# Patient Record
Sex: MALE | Race: BLACK OR AFRICAN AMERICAN | NOT HISPANIC OR LATINO | URBAN - METROPOLITAN AREA
[De-identification: names, ages, dates, MRNs, and addresses within clinical notes are randomized per-mention and may not be internally consistent; named-entity substitution may affect disease eponyms.]

---

## 2024-08-13 ENCOUNTER — APPOINTMENT (OUTPATIENT)
Dept: PRIMARY CARE | Facility: CLINIC | Age: 24
End: 2024-08-13

## 2024-08-13 ENCOUNTER — HOSPITAL ENCOUNTER (EMERGENCY)
Facility: HOSPITAL | Age: 24
Discharge: HOME | End: 2024-08-13
Attending: STUDENT IN AN ORGANIZED HEALTH CARE EDUCATION/TRAINING PROGRAM
Payer: COMMERCIAL

## 2024-08-13 VITALS
BODY MASS INDEX: 44.1 KG/M2 | HEIGHT: 71 IN | RESPIRATION RATE: 11 BRPM | WEIGHT: 315 LBS | HEART RATE: 98 BPM | OXYGEN SATURATION: 98 %

## 2024-08-13 VITALS
HEIGHT: 71 IN | DIASTOLIC BLOOD PRESSURE: 146 MMHG | WEIGHT: 315 LBS | TEMPERATURE: 98.6 F | BODY MASS INDEX: 44.1 KG/M2 | RESPIRATION RATE: 16 BRPM | OXYGEN SATURATION: 98 % | SYSTOLIC BLOOD PRESSURE: 198 MMHG | HEART RATE: 90 BPM

## 2024-08-13 DIAGNOSIS — R36.1 BLOOD IN SEMEN: Primary | ICD-10-CM

## 2024-08-13 DIAGNOSIS — I10 HYPERTENSION, UNSPECIFIED TYPE: Primary | ICD-10-CM

## 2024-08-13 DIAGNOSIS — I10 HYPERTENSION, UNSPECIFIED TYPE: ICD-10-CM

## 2024-08-13 PROCEDURE — 99283 EMERGENCY DEPT VISIT LOW MDM: CPT

## 2024-08-13 PROCEDURE — 3008F BODY MASS INDEX DOCD: CPT | Performed by: INTERNAL MEDICINE

## 2024-08-13 PROCEDURE — 99204 OFFICE O/P NEW MOD 45 MIN: CPT | Performed by: INTERNAL MEDICINE

## 2024-08-13 PROCEDURE — 1036F TOBACCO NON-USER: CPT | Performed by: INTERNAL MEDICINE

## 2024-08-13 PROCEDURE — 2500000001 HC RX 250 WO HCPCS SELF ADMINISTERED DRUGS (ALT 637 FOR MEDICARE OP): Performed by: STUDENT IN AN ORGANIZED HEALTH CARE EDUCATION/TRAINING PROGRAM

## 2024-08-13 RX ORDER — CIPROFLOXACIN 500 MG/1
500 TABLET ORAL 2 TIMES DAILY
Qty: 20 TABLET | Refills: 0 | Status: SHIPPED | OUTPATIENT
Start: 2024-08-13 | End: 2024-08-23

## 2024-08-13 RX ORDER — AMLODIPINE BESYLATE 5 MG/1
5 TABLET ORAL ONCE
Status: COMPLETED | OUTPATIENT
Start: 2024-08-13 | End: 2024-08-13

## 2024-08-13 RX ORDER — AMLODIPINE BESYLATE 5 MG/1
5 TABLET ORAL DAILY
Qty: 30 TABLET | Refills: 11 | Status: SHIPPED | OUTPATIENT
Start: 2024-08-13 | End: 2025-08-13

## 2024-08-13 ASSESSMENT — COLUMBIA-SUICIDE SEVERITY RATING SCALE - C-SSRS
1. IN THE PAST MONTH, HAVE YOU WISHED YOU WERE DEAD OR WISHED YOU COULD GO TO SLEEP AND NOT WAKE UP?: NO
2. HAVE YOU ACTUALLY HAD ANY THOUGHTS OF KILLING YOURSELF?: NO
6. HAVE YOU EVER DONE ANYTHING, STARTED TO DO ANYTHING, OR PREPARED TO DO ANYTHING TO END YOUR LIFE?: NO

## 2024-08-13 NOTE — PROGRESS NOTES
"Subjective   Chief complaint: Junior Boyd is a 24 y.o. male who presents for New Patient Visit ( new patient visit patient being seen for blood in semen noticed it 4-5 days ago. ).    HPI:  Patient here to establish care. He is unsure of the last time he saw a provider for general medical care. He denies any PMH and does not take any medications. He has a family history of HTN and DM. He denies smoking, drinking or drug use. Today he is complaining of blood in his semen in which he noticed on 8/10/24. He states it is a lot of bright red blood and has happened 3 times since then as well. He denies any blood in his urine or pain and this has never happened before. He is not sexually active. Today his BP was significantly elevated after multiple attempts. He states that he gets nervous when people take his BP and it is always high. He denies any headache or vision changes.         Objective   Pulse 98   Resp 11   Ht 1.803 m (5' 11\")   Wt (!) 152 kg (335 lb)   SpO2 98%   BMI 46.72 kg/m²   Physical Exam  HENT:      Head: Normocephalic.      Nose: Nose normal.      Mouth/Throat:      Mouth: Mucous membranes are moist.      Pharynx: Oropharynx is clear.   Eyes:      Pupils: Pupils are equal, round, and reactive to light.   Cardiovascular:      Rate and Rhythm: Normal rate and regular rhythm.      Pulses: Normal pulses.      Heart sounds: Normal heart sounds.   Pulmonary:      Effort: Pulmonary effort is normal.      Breath sounds: Normal breath sounds.   Abdominal:      General: Bowel sounds are normal.   Musculoskeletal:         General: Normal range of motion.      Cervical back: Normal range of motion.   Skin:     General: Skin is warm and dry.      Capillary Refill: Capillary refill takes less than 2 seconds.   Neurological:      Mental Status: He is alert and oriented to person, place, and time.         I have reviewed and reconciled the medication list with the patient today. No current outpatient " "medications on file.     Imaging:  No results found.     Labs reviewed:    No results found for: \"WBC\", \"HGB\", \"HCT\", \"PLT\", \"CHOL\", \"TRIG\", \"HDL\", \"LDLDIRECT\", \"ALT\", \"AST\", \"NA\", \"K\", \"CL\", \"CREATININE\", \"BUN\", \"CO2\", \"TSH\", \"PSA\", \"INR\", \"GLUF\", \"HGBA1C\", \"ALBUR\"    Assessment/Plan   Problem List Items Addressed This Visit          Cardiac and Vasculature    HTN (hypertension)     /140 (R) and 192/138 (L)   Start 5mg amlodipine once per day  Re evaluate at next visit.             Genitourinary and Reproductive    Blood in semen - Primary     Start Cipro 500 for 10 days            Continue current medications as listed  Follow up in 2 weeks for complete physical.      "

## 2024-08-14 NOTE — DISCHARGE INSTRUCTIONS
You were seen in the emergency department for high blood pressure.  We discussed what to look out for in terms of high blood pressure.  Please take the amlodipine as prescribed by your primary care doctor.  If you experience any headache, changes in vision (specifically blurry vision), confusion, nausea/vomiting, dizziness, chest pain, shortness of breath, decreased urination or leg swelling please return to the emergency department immediately as that is signs concerning for blood pressure damaging your organs.  Otherwise, please follow-up with your doctor next week.

## 2024-08-14 NOTE — ED PROVIDER NOTES
"[facility] ED Note    Date of Service: 8/13/2024  Reason for Visit: Hypertension (Went to docs office was prescribed amlodipine)      Patient History     ASHLY Boyd is a 24 y.o. male with no significant past medical history presents to the emergency department for hypertension.  Patient was just at his primary care doctor's office today establishing care, he was noted to have low blood pressure of 198/140.  He was started on 5 mg of amlodipine and had planned to have this reassessed at the next visit.  Patient states he was asymptomatic at that time.  He states that he did measure his blood pressure earlier this evening and noted to be 198 and given that it remained persistently elevated despite taking 5 mg of amlodipine, he came to the emergency department.  He denies any headache, changes in vision, confusion, nausea/vomiting, chest pain, shortness of breath, abdominal pain, and lower extremity edema.  He further denies any urinary retention.  He states he otherwise feels well and the same that he did earlier this morning at his appointment.      Past Medical History:   Diagnosis Date    Overweight     Overweight     No past surgical history on file.      Physical Exam     Vitals:    08/13/24 2054 08/13/24 2117 08/13/24 2219   BP: (!) 226/126 (!) 203/136 (!) 198/146   Pulse: (!) 119 (!) 114 90   Resp: 16     Temp: 37 °C (98.6 °F)     SpO2: 98%     Weight: 148 kg (326 lb 15.1 oz)     Height: 1.803 m (5' 11\")       General: Age-appropriate male, obese, nontoxic, sitting comfortably [place] no acute distress.  Pulmonary:   Non-labored breathing. Breath sounds clear bilaterally  Cardiac:  Regular rate   Abdomen:  Soft. Non-tender. Non-distended  Musculoskeletal:  No peripheral edema   Neuro:  Alert and oriented x 4. CN II-XII intact. 5/5 strength in all 4 extremities. Sensation grossly intact to light touch. Speech and mentation normal.  Gait narrow and stable     Diagnostic Studies "     Labs:  Please see EMR for labs obtained during this patient encounter.    Radiology:  Please see EMR for imaging obtained during this patient encounter.    ED Course and MDM     Junior Boyd is a 24 y.o. male with a history and presentation as described above in HPI.      Upon presentation, the patient was afebrile, well-appearing, with vital signs notable for hypertension and tachycardia initially.  Patient presented to the emergency department for asymptomatic hypertension.  Patient did not have any signs/symptoms to suggest endorgan damage, therefore no laboratory work or EKG was obtained during this visit.  Patient was given an additional dose of amlodipine here in the emergency department, he was observed for period of time with some improvement of his blood pressure as well as his heart rate.  Patient remained asymptomatic throughout his evaluation.  He just established care with his primary doctor today, and was just put on amlodipine, therefore no changes were made to his antihypertensive regiment as he does have follow-up with his primary care doctor next week.  He was given strict return precautions, specifically precautions to look for to suggest endorgan damage including headache, changes in vision, confusion or altered mental status, nausea/vomiting, chest pain, shortness of breath, urinary retention, and lower extremity edema.  He was subsequently discharged and instructed to follow-up with his primary care doctor next week.      Impression     Diagnoses as of 08/13/24 2229   Hypertension, unspecified type        Plan       Discharge, see DCI provided      Barrie Ellis MD  [facility] Emergency Medicine         Barrie Ellis MD  08/13/24 2229

## 2024-08-15 ENCOUNTER — TELEPHONE (OUTPATIENT)
Dept: PRIMARY CARE | Facility: CLINIC | Age: 24
End: 2024-08-15
Payer: COMMERCIAL

## 2024-08-15 DIAGNOSIS — E55.9 VITAMIN D DEFICIENCY: ICD-10-CM

## 2024-08-15 DIAGNOSIS — R36.1 BLOOD IN SEMEN: ICD-10-CM

## 2024-08-15 DIAGNOSIS — Z00.00 ANNUAL PHYSICAL EXAM: ICD-10-CM

## 2024-08-15 DIAGNOSIS — I10 HTN (HYPERTENSION): Primary | ICD-10-CM

## 2024-08-20 ENCOUNTER — APPOINTMENT (OUTPATIENT)
Dept: PRIMARY CARE | Facility: CLINIC | Age: 24
End: 2024-08-20
Payer: COMMERCIAL

## 2024-08-23 ENCOUNTER — APPOINTMENT (OUTPATIENT)
Dept: PRIMARY CARE | Facility: CLINIC | Age: 24
End: 2024-08-23
Payer: COMMERCIAL

## 2024-09-10 ENCOUNTER — LAB (OUTPATIENT)
Dept: LAB | Facility: LAB | Age: 24
End: 2024-09-10
Payer: COMMERCIAL

## 2024-09-10 DIAGNOSIS — E55.9 VITAMIN D DEFICIENCY: ICD-10-CM

## 2024-09-10 DIAGNOSIS — Z00.00 ANNUAL PHYSICAL EXAM: ICD-10-CM

## 2024-09-10 DIAGNOSIS — I10 HTN (HYPERTENSION): ICD-10-CM

## 2024-09-10 DIAGNOSIS — R36.1 BLOOD IN SEMEN: ICD-10-CM

## 2024-09-10 LAB
ERYTHROCYTE [DISTWIDTH] IN BLOOD BY AUTOMATED COUNT: 11.6 % (ref 11.5–14.5)
EST. AVERAGE GLUCOSE BLD GHB EST-MCNC: 108 MG/DL
HBA1C MFR BLD: 5.4 %
HCT VFR BLD AUTO: 50.9 % (ref 41–52)
HGB BLD-MCNC: 16.4 G/DL (ref 13.5–17.5)
MCH RBC QN AUTO: 26.1 PG (ref 26–34)
MCHC RBC AUTO-ENTMCNC: 32.2 G/DL (ref 32–36)
MCV RBC AUTO: 81 FL (ref 80–100)
NRBC BLD-RTO: 0 /100 WBCS (ref 0–0)
PLATELET # BLD AUTO: 321 X10*3/UL (ref 150–450)
RBC # BLD AUTO: 6.29 X10*6/UL (ref 4.5–5.9)
WBC # BLD AUTO: 12.5 X10*3/UL (ref 4.4–11.3)

## 2024-09-10 PROCEDURE — 80053 COMPREHEN METABOLIC PANEL: CPT

## 2024-09-10 PROCEDURE — 82306 VITAMIN D 25 HYDROXY: CPT

## 2024-09-10 PROCEDURE — 84443 ASSAY THYROID STIM HORMONE: CPT

## 2024-09-10 PROCEDURE — 80061 LIPID PANEL: CPT

## 2024-09-10 PROCEDURE — 36415 COLL VENOUS BLD VENIPUNCTURE: CPT

## 2024-09-10 PROCEDURE — 84153 ASSAY OF PSA TOTAL: CPT

## 2024-09-10 PROCEDURE — 85027 COMPLETE CBC AUTOMATED: CPT

## 2024-09-10 PROCEDURE — 83036 HEMOGLOBIN GLYCOSYLATED A1C: CPT

## 2024-09-11 LAB
25(OH)D3 SERPL-MCNC: 12 NG/ML (ref 30–100)
ALBUMIN SERPL BCP-MCNC: 4.8 G/DL (ref 3.4–5)
ALP SERPL-CCNC: 130 U/L (ref 33–120)
ALT SERPL W P-5'-P-CCNC: 64 U/L (ref 10–52)
ANION GAP SERPL CALC-SCNC: 19 MMOL/L (ref 10–20)
AST SERPL W P-5'-P-CCNC: 32 U/L (ref 9–39)
BILIRUB SERPL-MCNC: 0.7 MG/DL (ref 0–1.2)
BUN SERPL-MCNC: 14 MG/DL (ref 6–23)
CALCIUM SERPL-MCNC: 10.3 MG/DL (ref 8.6–10.6)
CHLORIDE SERPL-SCNC: 97 MMOL/L (ref 98–107)
CHOLEST SERPL-MCNC: 232 MG/DL (ref 0–199)
CHOLESTEROL/HDL RATIO: 4.5
CO2 SERPL-SCNC: 25 MMOL/L (ref 21–32)
CREAT SERPL-MCNC: 1.32 MG/DL (ref 0.5–1.3)
EGFRCR SERPLBLD CKD-EPI 2021: 77 ML/MIN/1.73M*2
GLUCOSE SERPL-MCNC: 109 MG/DL (ref 74–99)
HDLC SERPL-MCNC: 51.1 MG/DL
LDLC SERPL CALC-MCNC: 156 MG/DL
NON HDL CHOLESTEROL: 181 MG/DL (ref 0–149)
POTASSIUM SERPL-SCNC: 3.8 MMOL/L (ref 3.5–5.3)
PROT SERPL-MCNC: 8.1 G/DL (ref 6.4–8.2)
PSA SERPL-MCNC: 0.61 NG/ML
SODIUM SERPL-SCNC: 137 MMOL/L (ref 136–145)
TRIGL SERPL-MCNC: 126 MG/DL (ref 0–149)
TSH SERPL-ACNC: 1.58 MIU/L (ref 0.44–3.98)
VLDL: 25 MG/DL (ref 0–40)

## 2024-09-20 ENCOUNTER — APPOINTMENT (OUTPATIENT)
Dept: PRIMARY CARE | Facility: CLINIC | Age: 24
End: 2024-09-20
Payer: COMMERCIAL

## 2024-09-20 VITALS — HEART RATE: 93 BPM | RESPIRATION RATE: 12 BRPM | WEIGHT: 315 LBS | BODY MASS INDEX: 46.3 KG/M2 | OXYGEN SATURATION: 96 %

## 2024-09-20 DIAGNOSIS — E78.2 MODERATE MIXED HYPERLIPIDEMIA NOT REQUIRING STATIN THERAPY: ICD-10-CM

## 2024-09-20 DIAGNOSIS — Z00.00 ENCOUNTER FOR ANNUAL PHYSICAL EXAM: ICD-10-CM

## 2024-09-20 DIAGNOSIS — I10 HYPERTENSION, UNSPECIFIED TYPE: ICD-10-CM

## 2024-09-20 DIAGNOSIS — E78.00 ELEVATED LDL CHOLESTEROL LEVEL: ICD-10-CM

## 2024-09-20 DIAGNOSIS — R79.89 ELEVATED LFTS: ICD-10-CM

## 2024-09-20 DIAGNOSIS — I10 PRIMARY HYPERTENSION: ICD-10-CM

## 2024-09-20 DIAGNOSIS — I10 BENIGN ESSENTIAL HYPERTENSION: ICD-10-CM

## 2024-09-20 DIAGNOSIS — E03.9 HYPOTHYROIDISM, UNSPECIFIED TYPE: ICD-10-CM

## 2024-09-20 DIAGNOSIS — E55.9 VITAMIN D DEFICIENCY: Primary | ICD-10-CM

## 2024-09-20 DIAGNOSIS — D50.9 IRON DEFICIENCY ANEMIA, UNSPECIFIED IRON DEFICIENCY ANEMIA TYPE: ICD-10-CM

## 2024-09-20 PROBLEM — E78.5 HYPERLIPIDEMIA: Status: ACTIVE | Noted: 2024-09-20

## 2024-09-20 LAB
POC APPEARANCE, URINE: CLEAR
POC BILIRUBIN, URINE: NEGATIVE
POC BLOOD, URINE: NEGATIVE
POC COLOR, URINE: ABNORMAL
POC GLUCOSE, URINE: NEGATIVE MG/DL
POC KETONES, URINE: NEGATIVE MG/DL
POC LEUKOCYTES, URINE: NEGATIVE
POC NITRITE,URINE: NEGATIVE
POC PH, URINE: 5 PH
POC PROTEIN, URINE: NEGATIVE MG/DL
POC SPECIFIC GRAVITY, URINE: 1.02
POC UROBILINOGEN, URINE: 0.2 EU/DL

## 2024-09-20 PROCEDURE — 80074 ACUTE HEPATITIS PANEL: CPT

## 2024-09-20 PROCEDURE — 85027 COMPLETE CBC AUTOMATED: CPT

## 2024-09-20 ASSESSMENT — ENCOUNTER SYMPTOMS
DEPRESSION: 0
LOSS OF SENSATION IN FEET: 0
OCCASIONAL FEELINGS OF UNSTEADINESS: 0

## 2024-09-20 ASSESSMENT — PATIENT HEALTH QUESTIONNAIRE - PHQ9
SUM OF ALL RESPONSES TO PHQ9 QUESTIONS 1 AND 2: 0
1. LITTLE INTEREST OR PLEASURE IN DOING THINGS: NOT AT ALL
2. FEELING DOWN, DEPRESSED OR HOPELESS: NOT AT ALL

## 2024-09-20 NOTE — ASSESSMENT & PLAN NOTE
Flax seed omega 3 1200mcg with every meal (TID)    Discussed importance of healthy diet - consider sharing diet informational packet at next visit

## 2024-09-20 NOTE — PROGRESS NOTES
ANNUAL WELLNESS VISIT    Subjective :  Chief Complaint: Junior Boyd is an 24 y.o. male here for an annual wellness visit and general medical care and f/u.     HPI:  Pt presents for annual wellness exam, with PMH HTN on amlodipine 5mg.    Walks moderately for exercise. Blood pressure high at home, ~167/117 at the lowest.     No tobacco use, marijuana use, drug use. Very minimal alcohol use.         Objective   Pulse 93   Resp 12   Wt (!) 151 kg (332 lb)   SpO2 96%   BMI 46.30 kg/m²     Physical Exam  Constitutional:       Appearance: Normal appearance.   HENT:      Head: Normocephalic and atraumatic.      Right Ear: External ear normal.      Left Ear: External ear normal.      Nose: Nose normal.   Eyes:      Extraocular Movements: Extraocular movements intact.   Neck:      Vascular: No carotid bruit.   Cardiovascular:      Rate and Rhythm: Normal rate and regular rhythm.      Heart sounds: Normal heart sounds.   Pulmonary:      Effort: Pulmonary effort is normal.      Breath sounds: Normal breath sounds.   Abdominal:      General: Bowel sounds are normal. There is no distension.      Palpations: Abdomen is soft. There is no mass.      Tenderness: There is no abdominal tenderness. There is no guarding.      Hernia: No hernia is present.   Musculoskeletal:      Cervical back: Neck supple. No rigidity or tenderness.   Lymphadenopathy:      Cervical: No cervical adenopathy.   Skin:     General: Skin is dry.   Neurological:      General: No focal deficit present.      Mental Status: He is alert.   Psychiatric:         Mood and Affect: Mood normal.         Behavior: Behavior normal.         Thought Content: Thought content normal.         Judgment: Judgment normal.         Imaging:  No results found.     Labs reviewed:    Lab Results   Component Value Date    WBC 12.5 (H) 09/10/2024    HGB 16.4 09/10/2024    HCT 50.9 09/10/2024     09/10/2024    CHOL 232 (H) 09/10/2024    TRIG 126 09/10/2024    HDL 51.1  09/10/2024    ALT 64 (H) 09/10/2024    AST 32 09/10/2024     09/10/2024    K 3.8 09/10/2024    CL 97 (L) 09/10/2024    CREATININE 1.32 (H) 09/10/2024    BUN 14 09/10/2024    CO2 25 09/10/2024    TSH 1.58 09/10/2024    HGBA1C 5.4 09/10/2024       Past Medical, Surgical, and Family History reviewed and updated in chart.    I have reviewed and reconciled the medication list with the patient today. No current outpatient medications on file.     List of current healthcare providers:  Patient Care Team:  Ez Smiht MD as PCP - General (Internal Medicine)     HRA:  Over the past 2 weeks, how often have you been bothered by any of the following problems?  Little interest or pleasure in doing things: Not at all  Feeling down, depressed, or hopeless: Not at all  Patient Health Questionnaire-2 Score: 0    Steadi Fall Risk  One or more falls in the last year? No  How many Times?    Was the patient injured in the fall?    Has trouble stepping onto curb? No  Advised to use a cane or walker to get around safely? No  Often has to rush to toilet? No  Feels unsteady when walking? No  Has lost some feeling in feet? No  Often feels sad or depressed? No  Steadies self on furniture while walking at home? No  Takes medicine that makes them feel lightheaded or more tired than usual? No  Worried about Falling? No  Takes medicine to sleep or improve mood? No  Needs to push with hands when rising from a chair? No                                          Assessment/Plan :  Problem List Items Addressed This Visit       HTN (hypertension)     Increase to amlodipine 10mg         Vitamin D deficiency - Primary     50,000iu weekly for 3 mo.         Relevant Orders    Vitamin D 25-Hydroxy,Total (for eval of Vitamin D levels)    Elevated LFTs     Hepatitis panel     Referral for liver US         Relevant Orders    Hepatitis Panel, Acute    CBC    Lipid Panel    Hyperlipidemia     Flax seed omega 3 1200mcg with every meal (TID)    Discussed  importance of healthy diet - consider sharing diet informational packet at next visit          Other Visit Diagnoses       Encounter for annual physical exam        Relevant Orders    POCT UA (nonautomated) manually resulted (Completed)    Hypothyroidism, unspecified type        Iron deficiency anemia, unspecified iron deficiency anemia type        Benign essential hypertension        Relevant Orders    Comprehensive Metabolic Panel    Elevated LDL cholesterol level              The following health maintenance schedule was reviewed with the patient and provided in printed form in the after visit summary:  Health Maintenance   Topic Date Due    HIV Screening  Never done    MMR Vaccines (1 of 1 - Standard series) Never done    Varicella Vaccines (2 of 2 - 2-dose childhood series) 01/03/2007    HPV Vaccines (1 - Male 3-dose series) Never done    Hepatitis C Screening  Never done    Hepatitis B Vaccines (1 of 3 - 19+ 3-dose series) Never done    DTaP/Tdap/Td Vaccines (1 - Tdap) Never done    Influenza Vaccine (1) Never done    COVID-19 Vaccine (3 - 2023-24 season) 09/01/2024    TSH Level  09/10/2025    Yearly Adult Physical  09/21/2025    Lipid Panel  09/10/2029    Zoster Vaccines (1 of 2) 02/22/2050    HIB Vaccines  Aged Out    IPV Vaccines  Aged Out    Hepatitis A Vaccines  Aged Out    Meningococcal Vaccine  Aged Out    Rotavirus Vaccines  Aged Out    Pneumococcal Vaccine: Pediatrics (0 to 5 Years) and At-Risk Patients (6 to 64 Years)  Aged Out       Advance Care Planning   Not at this time              Orders Placed This Encounter   Procedures    Hepatitis Panel, Acute     Order Specific Question:   Release result to [facility]     Answer:   Immediate    CBC     Order Specific Question:   Release result to [facility] [employer]     Answer:   Immediate    Comprehensive Metabolic Panel     Standing Status:   Future     Standing Expiration Date:   12/20/2024     Order Specific Question:   Release result to [facility]     Answer:    Immediate    Lipid Panel     Standing Status:   Future     Standing Expiration Date:   12/20/2024     Order Specific Question:   Release result to [facility]     Answer:   Immediate    Vitamin D 25-Hydroxy,Total (for eval of Vitamin D levels)     Standing Status:   Future     Standing Expiration Date:   12/20/2024     Order Specific Question:   Release result to [employer]     Answer:   Immediate    POCT UA (nonautomated) manually resulted     Order Specific Question:   Release result to [employer]     Answer:   Immediate [1]       Continue current medications as listed  Follow up in 2 weeks for BP; 3 weeks for blood work.

## 2024-09-21 LAB
ERYTHROCYTE [DISTWIDTH] IN BLOOD BY AUTOMATED COUNT: 11.7 % (ref 11.5–14.5)
HAV IGM SER QL: NONREACTIVE
HBV CORE IGM SER QL: NONREACTIVE
HBV SURFACE AG SERPL QL IA: NONREACTIVE
HCT VFR BLD AUTO: 48.9 % (ref 41–52)
HCV AB SER QL: NONREACTIVE
HGB BLD-MCNC: 15.8 G/DL (ref 13.5–17.5)
MCH RBC QN AUTO: 26.3 PG (ref 26–34)
MCHC RBC AUTO-ENTMCNC: 32.3 G/DL (ref 32–36)
MCV RBC AUTO: 81 FL (ref 80–100)
NRBC BLD-RTO: 0 /100 WBCS (ref 0–0)
PLATELET # BLD AUTO: 284 X10*3/UL (ref 150–450)
RBC # BLD AUTO: 6.01 X10*6/UL (ref 4.5–5.9)
WBC # BLD AUTO: 9.7 X10*3/UL (ref 4.4–11.3)

## 2024-09-21 RX ORDER — AMLODIPINE BESYLATE 10 MG/1
10 TABLET ORAL DAILY
Qty: 30 TABLET | Refills: 5 | Status: SHIPPED | OUTPATIENT
Start: 2024-09-21 | End: 2025-03-20

## 2024-09-21 RX ORDER — ERGOCALCIFEROL 1.25 MG/1
50000 CAPSULE ORAL WEEKLY
Qty: 6 CAPSULE | Refills: 0 | Status: SHIPPED | OUTPATIENT
Start: 2024-09-21 | End: 2024-11-02

## 2024-09-21 RX ORDER — FLAXSEED OIL 1000 MG
1 CAPSULE ORAL 3 TIMES DAILY
Qty: 90 CAPSULE | Refills: 3 | Status: SHIPPED | OUTPATIENT
Start: 2024-09-21

## 2024-09-27 ENCOUNTER — HOSPITAL ENCOUNTER (EMERGENCY)
Facility: HOSPITAL | Age: 24
Discharge: HOME | End: 2024-09-27
Payer: COMMERCIAL

## 2024-09-27 VITALS
SYSTOLIC BLOOD PRESSURE: 171 MMHG | DIASTOLIC BLOOD PRESSURE: 111 MMHG | RESPIRATION RATE: 18 BRPM | HEART RATE: 87 BPM | WEIGHT: 315 LBS | TEMPERATURE: 97.6 F | BODY MASS INDEX: 44.1 KG/M2 | OXYGEN SATURATION: 98 % | HEIGHT: 71 IN

## 2024-09-27 DIAGNOSIS — I99.8 POORLY CONTROLLED BLOOD PRESSURE: ICD-10-CM

## 2024-09-27 DIAGNOSIS — H61.22 IMPACTED CERUMEN OF LEFT EAR: Primary | ICD-10-CM

## 2024-09-27 PROCEDURE — 99282 EMERGENCY DEPT VISIT SF MDM: CPT | Performed by: PHYSICIAN ASSISTANT

## 2024-09-27 PROCEDURE — 69209 REMOVE IMPACTED EAR WAX UNI: CPT | Performed by: PHYSICIAN ASSISTANT

## 2024-09-27 PROCEDURE — 69210 REMOVE IMPACTED EAR WAX UNI: CPT | Mod: LT | Performed by: PHYSICIAN ASSISTANT

## 2024-09-27 ASSESSMENT — PAIN DESCRIPTION - ONSET: ONSET: GRADUAL

## 2024-09-27 ASSESSMENT — PAIN - FUNCTIONAL ASSESSMENT: PAIN_FUNCTIONAL_ASSESSMENT: 0-10

## 2024-09-27 ASSESSMENT — PAIN DESCRIPTION - FREQUENCY: FREQUENCY: CONSTANT/CONTINUOUS

## 2024-09-27 ASSESSMENT — PAIN SCALES - GENERAL
PAINLEVEL_OUTOF10: 3
PAINLEVEL_OUTOF10: 3

## 2024-09-27 ASSESSMENT — PAIN DESCRIPTION - PAIN TYPE: TYPE: ACUTE PAIN

## 2024-09-27 ASSESSMENT — PAIN DESCRIPTION - LOCATION: LOCATION: EAR

## 2024-09-27 ASSESSMENT — PAIN DESCRIPTION - ORIENTATION: ORIENTATION: LEFT

## 2024-09-27 ASSESSMENT — PAIN DESCRIPTION - PROGRESSION: CLINICAL_PROGRESSION: GRADUALLY WORSENING

## 2024-09-27 NOTE — ED PROVIDER NOTES
Chief Complaint   Patient presents with    Ear Fullness     HPI:   Junior Boyd is an 24 y.o. male with history of HTN that presents to the ED with mother for evaluation of left ear fullness.  He says it is not bothering him for the past few days.  He denies any otorrhea, tinnitus, pain, headache, sore throat, neck pain or stiffness, fever, chills, chest pain, dizziness or lightheadedness, blurred vision, double vision, syncope or shortness of breath.    Medications: Amlodipine  Soc HX: Denies substance use NKDA  No Known Allergies:  Past Medical History:   Diagnosis Date    Overweight     Overweight     History reviewed. No pertinent surgical history.  Family History   Problem Relation Name Age of Onset    Other (htn) Mother      Other (htn) Father        Physical Exam  Vitals and nursing note reviewed.   Constitutional:       General: He is not in acute distress.     Appearance: Normal appearance. He is not ill-appearing or toxic-appearing.      Comments: Elevated BMI   HENT:      Right Ear: Tympanic membrane, ear canal and external ear normal.      Left Ear: External ear normal. There is impacted cerumen.      Nose: Nose normal.      Mouth/Throat:      Mouth: Mucous membranes are moist.   Eyes:      Pupils: Pupils are equal, round, and reactive to light.   Cardiovascular:      Rate and Rhythm: Normal rate and regular rhythm.      Pulses: Normal pulses.      Heart sounds: Normal heart sounds.   Pulmonary:      Effort: Pulmonary effort is normal.      Breath sounds: Normal breath sounds.   Musculoskeletal:         General: Normal range of motion.   Skin:     General: Skin is warm and dry.   Neurological:      Mental Status: He is alert.      Cranial Nerves: No cranial nerve deficit.      VS: As documented in the triage note and EMR flowsheet from this visit were reviewed.    External Records Reviewed: I reviewed recent and relevant outside records including: Reviewed PCP note 9/20/2024.  Patient seen for AWV.   He was instructed to increase his amlodipine to 10 mg daily, start vitamin D and make dietary changes.      Medical Decision Making:   ED Course as of 09/27/24 0301   Fri Sep 27, 2024   0200 Vitals Reviewed: Afebrile. Hypertensive. Not tachycardic nor tachypneic. No hypoxia.   [KA]   0257 Patient is a 24-year-old male who presents to the ED for evaluation of left ear fullness.  On exam he has cerumen impaction of the left ear.  It is very deep in the ear canal and it is up against the TM.  I recommended Debrox and follow-up with PCP.  He asked for me to try to get it out with a curette.  We discussed risks of curette removal when earwax is so deep.  He verbalized understanding.  I was only able to remove small amount of cerumen with curette.  He asked me to attempt with irrigation.  Following irrigation no cerumen was removed.  Will send him home with prescription for Debrox.  Patient blood pressure significantly elevated.  He denies any signs or symptoms concerning for hypertensive emergency or urgency and as such does not necessitate workup for asymptomatic hypertension.  I recommended he take his amlodipine 10 mg daily and talk to his doctor about adding a second medication.  He is agreeable plan [KA]      ED Course User Index  [KA] Ania Yen PA-C         Diagnoses as of 09/27/24 0301   Impacted cerumen of left ear   Poorly controlled blood pressure       Ear Cerumen Removal    Performed by: Ania Yen PA-C  Authorized by: Ania Yen PA-C    Consent:     Consent obtained:  Verbal    Consent given by:  Patient    Risks, benefits, and alternatives were discussed: yes      Risks discussed:  Bleeding, infection, dizziness, incomplete removal, pain and TM perforation    Alternatives discussed:  No treatment  Universal protocol:     Procedure explained and questions answered to patient or proxy's satisfaction: yes      Immediately prior to procedure, a time out was called: yes      Patient  identity confirmed:  Verbally with patient  Procedure details:     Location:  L ear    Procedure type: irrigation      Procedure outcomes: unable to remove cerumen    Post-procedure details:     Inspection:  Some cerumen remaining and macerated skin    Hearing quality:  Diminished    Procedure completion:  Tolerated    Chronic Medical Conditions Significantly Affecting Care: Poorly controlled HTN     Escalation of Care: Appropriate for outpatient management    Counseling: Spoke with the patient and discussed today´s findings, in addition to providing specific details for the plan of care and expected course.  Patient was given the opportunity to ask questions.    Discussed return precautions and importance of follow-up.  Advised to follow-up with PCP.  Advised to return to the ED for changing or worsening symptoms, new symptoms, complaint specific precautions, and precautions listed on the discharge paperwork.  Educated on the common potential side effects of medications prescribed.    I advised the patient that the emergency evaluation and treatment provided today doesn't end their need for medical care. It is very important that they follow-up with their primary care provider or other specialist as instructed.    The plan of care was mutually agreed upon with the patient. The patient and/or family were given the opportunity to ask questions. All questions asked today in the ED were answered to the best of my ability with today's information.    I specifically advised the patient to return to the ED for changing or worsening symptoms, worrisome new symptoms, or for any complaint specific precautions listed on the discharge paperwork.    This patient was cared for in the setting of nationwide stress on resources and staffing.    This report was transcribed using voice recognition software.  Every effort was made to ensure accuracy, however, inadvertently computerized transcription errors may be present.        Ania Yen PA-C  09/27/24 3404

## 2024-09-27 NOTE — DISCHARGE INSTRUCTIONS
Please use Debrox drops nightly for the next 4-5 nights.  Follow-up with primary care provider within the next week for ear recheck.    Please continue to take 10 mg of amlodipine daily.  Recommend talking to your doctor about blood pressure recheck and they may need to add a second medication.    Return to ER for any new or worsening symptoms

## 2024-09-27 NOTE — ED TRIAGE NOTES
Pt arrived to the ED with a chief complaint of ear pain. Pt states he has been experiencing ear fullness in his left ear. Pt has trying to unclog his ear but says water makes it worse and he is having trouble hearing. Pain is 3/10. Abcs intact. Denies covid. Pt has recently been experiencing high blood pressure.

## 2024-09-30 ENCOUNTER — HOSPITAL ENCOUNTER (OUTPATIENT)
Dept: RADIOLOGY | Facility: HOSPITAL | Age: 24
Discharge: HOME | End: 2024-09-30
Payer: COMMERCIAL

## 2024-09-30 DIAGNOSIS — R79.89 ELEVATED LFTS: ICD-10-CM

## 2024-09-30 PROCEDURE — 76705 ECHO EXAM OF ABDOMEN: CPT

## 2024-09-30 PROCEDURE — 76705 ECHO EXAM OF ABDOMEN: CPT | Performed by: RADIOLOGY

## 2024-10-04 ENCOUNTER — APPOINTMENT (OUTPATIENT)
Dept: PRIMARY CARE | Facility: CLINIC | Age: 24
End: 2024-10-04
Payer: COMMERCIAL

## 2024-10-04 VITALS
BODY MASS INDEX: 47.28 KG/M2 | WEIGHT: 315 LBS | RESPIRATION RATE: 12 BRPM | OXYGEN SATURATION: 97 % | HEART RATE: 109 BPM

## 2024-10-04 DIAGNOSIS — E66.9 OBESITY, UNSPECIFIED CLASS, UNSPECIFIED OBESITY TYPE, UNSPECIFIED WHETHER SERIOUS COMORBIDITY PRESENT: ICD-10-CM

## 2024-10-04 DIAGNOSIS — E55.9 VITAMIN D DEFICIENCY: ICD-10-CM

## 2024-10-04 DIAGNOSIS — I10 PRIMARY HYPERTENSION: Primary | ICD-10-CM

## 2024-10-04 PROBLEM — K76.0 FATTY METAMORPHOSIS OF LIVER: Status: ACTIVE | Noted: 2024-10-04

## 2024-10-04 PROCEDURE — 1036F TOBACCO NON-USER: CPT | Performed by: INTERNAL MEDICINE

## 2024-10-04 PROCEDURE — 99214 OFFICE O/P EST MOD 30 MIN: CPT | Performed by: INTERNAL MEDICINE

## 2024-10-04 RX ORDER — METOPROLOL SUCCINATE 50 MG/1
50 TABLET, EXTENDED RELEASE ORAL DAILY
Qty: 30 TABLET | Refills: 5 | Status: SHIPPED | OUTPATIENT
Start: 2024-10-04 | End: 2025-04-02

## 2024-10-04 NOTE — ASSESSMENT & PLAN NOTE
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  Discussed with patient importance of weight loss and dietary changes to decrease progression.   Referral made to dietician after discussion with patient.   Continue to monitor LFTs.

## 2024-10-04 NOTE — ASSESSMENT & PLAN NOTE
Referral made to dietician for weight loss and individualized meal planning.   Encouraged increase in physical activity efforts.

## 2024-10-04 NOTE — PROGRESS NOTES
Subjective   Chief complaint: Junior Boyd is a 24 y.o. male who presents for Follow-up ( patient being seen for blood pressure check, patient is also being seen for ultrasound results of liver).    HPI:  Patient is here for a blood pressure follow-up and follow-up on ultrasound of liver results. Does not report any complaints.        Objective   Pulse 109   Resp 12   Wt (!) 154 kg (339 lb)   SpO2 97%   BMI 47.28 kg/m²   Physical Exam  Vitals reviewed.   Constitutional:       Appearance: He is obese.   HENT:      Head: Normocephalic.      Right Ear: External ear normal.      Left Ear: External ear normal.      Mouth/Throat:      Pharynx: Oropharynx is clear.   Eyes:      General: No scleral icterus.     Extraocular Movements: Extraocular movements intact.   Cardiovascular:      Rate and Rhythm: Regular rhythm. Tachycardia present.      Pulses: Normal pulses.      Heart sounds: Normal heart sounds.   Pulmonary:      Effort: Pulmonary effort is normal.      Breath sounds: Normal breath sounds.   Neurological:      General: No focal deficit present.      Mental Status: He is alert.   Psychiatric:         Mood and Affect: Mood normal.         Behavior: Behavior normal.         Thought Content: Thought content normal.         Judgment: Judgment normal.         I have reviewed and reconciled the medication list with the patient today.   Current Outpatient Medications:     amLODIPine (Norvasc) 10 mg tablet, Take 1 tablet (10 mg) by mouth once daily., Disp: 30 tablet, Rfl: 5    ergocalciferol (Vitamin D-2) 1.25 MG (61438 UT) capsule, Take 1 capsule (50,000 Units) by mouth 1 (one) time per week., Disp: 6 capsule, Rfl: 0    flaxseed oiL (Omega-3 flaxseed oiL) 1,000 mg capsule, Take 1 capsule (1,000 mg) by mouth 3 times a day., Disp: 90 capsule, Rfl: 3     Imaging:  US abdomen limited liver    Result Date: 10/1/2024  Interpreted By:  Arsenio Castillo, STUDY: US ABDOMEN LIMITED LIVER; ;  9/30/2024 5:22 pm    INDICATION: Signs/Symptoms:abnormal LFT'S.   COMPARISON: None.   ACCESSION NUMBER(S): [id number]   ORDERING CLINICIAN: MARK CROUCH   TECHNIQUE: Multiple sonographic images of the right upper quadrant are performed.   FINDINGS: The pancreas is obscured by shadowing bowel.   The liver is mildly enlarged measuring 18.8 cm in length. The liver is of diffusely increased echogenicity. There is poor penetration of the liver parenchyma compatible with fatty metamorphosis. There is no surrounding fluid. There is a focal area of relative hypoechogenicity in the liver adjacent to the gallbladder fundus which measures 1.6 x 1.3 x 2.1 cm in diameter. This appearance is suspicious for focal fatty sparing. There is no intrahepatic or extrahepatic bile duct dilatation. The common duct measures 3 mm in diameter.   There is no shadowing calculus in the gallbladder. There is no wall thickening or surrounding fluid. There is a negative sonographic Bravo's sign.   The right kidney measures 12.9 cm in length. The right renal cortex is hypoechoic to liver. There is no hydronephrosis or perinephric fluid collection. No shadowing calculus is detected.       Fatty metamorphosis of the liver.   Focal liver hypoechogenicity adjacent to the gallbladder could reflect focal fatty sparing.   The remainder of the right upper quadrant is unremarkable     MACRO: None   Signed by: Arsenio Castillo 10/1/2024 5:47 PM Dictation workstation:   [id number]       Labs reviewed:    Lab Results   Component Value Date    WBC 9.7 09/20/2024    HGB 15.8 09/20/2024    HCT 48.9 09/20/2024     09/20/2024    CHOL 232 (H) 09/10/2024    TRIG 126 09/10/2024    HDL 51.1 09/10/2024    ALT 64 (H) 09/10/2024    AST 32 09/10/2024     09/10/2024    K 3.8 09/10/2024    CL 97 (L) 09/10/2024    CREATININE 1.32 (H) 09/10/2024    BUN 14 09/10/2024    CO2 25 09/10/2024    TSH 1.58 09/10/2024    HGBA1C 5.4 09/10/2024       Assessment/Plan   Problem List Items  Addressed This Visit       HTN (hypertension) - Primary     BP in office 170/11 right arm, 165/95 left arm.  Reports that he checks it at home periodically and is normally around 170-180s. Denies headache, blurry vision, shortness or breath, chest pain.     Continue Amlodipine 10 mg daily.   Begin Metoprolol-ER 50 mg.   Reinforced lifestyle modifications including physical activity, dietary efforts, and low sodium diet. Follow-up blood pressure check at next visit.          Vitamin D deficiency     Continue Vitamin D supplement.          Obesity     Referral made to dietician for weight loss and individualized meal planning.   Encouraged increase in physical activity efforts.            Continue current medications as listed  Follow up in 3 months.

## 2024-10-04 NOTE — ASSESSMENT & PLAN NOTE
BP in office 170/11 right arm, 165/95 left arm.  Reports that he checks it at home periodically and is normally around 170-180s. Denies headache, blurry vision, shortness or breath, chest pain.     Continue Amlodipine 10 mg daily.   Begin Metoprolol-ER 50 mg.   Reinforced lifestyle modifications including physical activity, dietary efforts, and low sodium diet. Follow-up blood pressure check at next visit.

## 2024-10-18 ENCOUNTER — APPOINTMENT (OUTPATIENT)
Dept: PRIMARY CARE | Facility: CLINIC | Age: 24
End: 2024-10-18
Payer: COMMERCIAL

## 2024-10-18 DIAGNOSIS — E55.9 VITAMIN D DEFICIENCY: ICD-10-CM

## 2024-10-18 DIAGNOSIS — E78.2 MODERATE MIXED HYPERLIPIDEMIA NOT REQUIRING STATIN THERAPY: ICD-10-CM

## 2024-10-18 DIAGNOSIS — D50.9 IRON DEFICIENCY ANEMIA, UNSPECIFIED IRON DEFICIENCY ANEMIA TYPE: ICD-10-CM

## 2024-10-18 DIAGNOSIS — E03.9 HYPOTHYROIDISM, UNSPECIFIED TYPE: ICD-10-CM

## 2024-10-18 DIAGNOSIS — I10 BENIGN ESSENTIAL HYPERTENSION: ICD-10-CM

## 2024-10-18 DIAGNOSIS — I10 PRIMARY HYPERTENSION: ICD-10-CM

## 2024-10-18 DIAGNOSIS — E78.00 ELEVATED LDL CHOLESTEROL LEVEL: ICD-10-CM

## 2024-10-18 LAB
ALBUMIN SERPL BCP-MCNC: 4.6 G/DL (ref 3.4–5)
ALP SERPL-CCNC: 120 U/L (ref 33–120)
ALT SERPL W P-5'-P-CCNC: 52 U/L (ref 10–52)
ANION GAP SERPL CALC-SCNC: 16 MMOL/L (ref 10–20)
AST SERPL W P-5'-P-CCNC: 27 U/L (ref 9–39)
BILIRUB SERPL-MCNC: 0.6 MG/DL (ref 0–1.2)
BUN SERPL-MCNC: 14 MG/DL (ref 6–23)
CALCIUM SERPL-MCNC: 9.9 MG/DL (ref 8.6–10.6)
CHLORIDE SERPL-SCNC: 104 MMOL/L (ref 98–107)
CHOLEST SERPL-MCNC: 242 MG/DL (ref 0–199)
CHOLESTEROL/HDL RATIO: 4.8
CO2 SERPL-SCNC: 24 MMOL/L (ref 21–32)
CREAT SERPL-MCNC: 1.36 MG/DL (ref 0.5–1.3)
EGFRCR SERPLBLD CKD-EPI 2021: 75 ML/MIN/1.73M*2
GLUCOSE SERPL-MCNC: 85 MG/DL (ref 74–99)
HDLC SERPL-MCNC: 50.7 MG/DL
LDLC SERPL CALC-MCNC: 176 MG/DL
NON HDL CHOLESTEROL: 191 MG/DL (ref 0–149)
POTASSIUM SERPL-SCNC: 4.3 MMOL/L (ref 3.5–5.3)
PROT SERPL-MCNC: 7.4 G/DL (ref 6.4–8.2)
SODIUM SERPL-SCNC: 140 MMOL/L (ref 136–145)
TRIGL SERPL-MCNC: 79 MG/DL (ref 0–149)
VLDL: 16 MG/DL (ref 0–40)

## 2024-10-18 PROCEDURE — 80053 COMPREHEN METABOLIC PANEL: CPT

## 2024-10-18 PROCEDURE — 80061 LIPID PANEL: CPT

## 2024-10-18 PROCEDURE — 82306 VITAMIN D 25 HYDROXY: CPT

## 2024-10-19 LAB — 25(OH)D3 SERPL-MCNC: 32 NG/ML (ref 30–100)

## 2024-10-21 ENCOUNTER — APPOINTMENT (OUTPATIENT)
Dept: PRIMARY CARE | Facility: CLINIC | Age: 24
End: 2024-10-21
Payer: COMMERCIAL

## 2024-10-21 VITALS
BODY MASS INDEX: 44.1 KG/M2 | HEIGHT: 71 IN | DIASTOLIC BLOOD PRESSURE: 90 MMHG | HEART RATE: 79 BPM | OXYGEN SATURATION: 97 % | SYSTOLIC BLOOD PRESSURE: 152 MMHG | RESPIRATION RATE: 12 BRPM | WEIGHT: 315 LBS

## 2024-10-21 DIAGNOSIS — E55.9 VITAMIN D DEFICIENCY: ICD-10-CM

## 2024-10-21 DIAGNOSIS — K76.0 FATTY METAMORPHOSIS OF LIVER: ICD-10-CM

## 2024-10-21 DIAGNOSIS — I10 HYPERTENSION, UNSPECIFIED TYPE: ICD-10-CM

## 2024-10-21 DIAGNOSIS — R79.89 ELEVATED LFTS: Primary | ICD-10-CM

## 2024-10-21 DIAGNOSIS — E78.2 MODERATE MIXED HYPERLIPIDEMIA NOT REQUIRING STATIN THERAPY: ICD-10-CM

## 2024-10-21 DIAGNOSIS — N18.9 CHRONIC KIDNEY DISEASE, UNSPECIFIED CKD STAGE: ICD-10-CM

## 2024-10-21 PROCEDURE — 3077F SYST BP >= 140 MM HG: CPT | Performed by: INTERNAL MEDICINE

## 2024-10-21 PROCEDURE — 99214 OFFICE O/P EST MOD 30 MIN: CPT | Performed by: INTERNAL MEDICINE

## 2024-10-21 PROCEDURE — 3080F DIAST BP >= 90 MM HG: CPT | Performed by: INTERNAL MEDICINE

## 2024-10-21 PROCEDURE — 3008F BODY MASS INDEX DOCD: CPT | Performed by: INTERNAL MEDICINE

## 2024-10-21 PROCEDURE — 1036F TOBACCO NON-USER: CPT | Performed by: INTERNAL MEDICINE

## 2024-10-21 RX ORDER — METOPROLOL TARTRATE 100 MG/1
100 TABLET ORAL DAILY
Qty: 90 TABLET | Refills: 1 | Status: SHIPPED | OUTPATIENT
Start: 2024-10-21

## 2024-10-21 NOTE — PROGRESS NOTES
"Subjective   Chief complaint: Junior Boyd is a 24 y.o. male who presents for Follow-up ( patient being seen for blood work follow-up/).    HPI:  Follow-up general medical care        Objective   /90   Pulse 79   Resp 12   Ht 1.803 m (5' 11\")   Wt (!) 152 kg (335 lb)   SpO2 97%   BMI 46.72 kg/m²   Physical Exam  Vitals reviewed.   Constitutional:       Appearance: Normal appearance.   HENT:      Head: Normocephalic and atraumatic.   Cardiovascular:      Rate and Rhythm: Normal rate and regular rhythm.   Pulmonary:      Effort: Pulmonary effort is normal.      Breath sounds: Normal breath sounds.   Abdominal:      General: Bowel sounds are normal.      Palpations: Abdomen is soft.   Musculoskeletal:      Cervical back: Neck supple.   Skin:     General: Skin is warm and dry.   Neurological:      General: No focal deficit present.      Mental Status: He is alert.   Psychiatric:         Mood and Affect: Mood normal.         Behavior: Behavior is cooperative.         I have reviewed and reconciled the medication list with the patient today.   Current Outpatient Medications:     amLODIPine (Norvasc) 10 mg tablet, Take 1 tablet (10 mg) by mouth once daily., Disp: 30 tablet, Rfl: 5    ergocalciferol (Vitamin D-2) 1.25 MG (80161 UT) capsule, Take 1 capsule (50,000 Units) by mouth 1 (one) time per week., Disp: 6 capsule, Rfl: 0    flaxseed oiL (Omega-3 flaxseed oiL) 1,000 mg capsule, Take 1 capsule (1,000 mg) by mouth 3 times a day., Disp: 90 capsule, Rfl: 3     Imaging:  US abdomen limited liver    Result Date: 10/1/2024  Interpreted By:  Arsenio Castillo, STUDY: US ABDOMEN LIMITED LIVER; ;  9/30/2024 5:22 pm   INDICATION: Signs/Symptoms:abnormal LFT'S.   COMPARISON: None.   ACCESSION NUMBER(S): [id number]   ORDERING CLINICIAN: MARK CROUCH   TECHNIQUE: Multiple sonographic images of the right upper quadrant are performed.   FINDINGS: The pancreas is obscured by shadowing bowel.   The liver is mildly " enlarged measuring 18.8 cm in length. The liver is of diffusely increased echogenicity. There is poor penetration of the liver parenchyma compatible with fatty metamorphosis. There is no surrounding fluid. There is a focal area of relative hypoechogenicity in the liver adjacent to the gallbladder fundus which measures 1.6 x 1.3 x 2.1 cm in diameter. This appearance is suspicious for focal fatty sparing. There is no intrahepatic or extrahepatic bile duct dilatation. The common duct measures 3 mm in diameter.   There is no shadowing calculus in the gallbladder. There is no wall thickening or surrounding fluid. There is a negative sonographic Bravo's sign.   The right kidney measures 12.9 cm in length. The right renal cortex is hypoechoic to liver. There is no hydronephrosis or perinephric fluid collection. No shadowing calculus is detected.       Fatty metamorphosis of the liver.   Focal liver hypoechogenicity adjacent to the gallbladder could reflect focal fatty sparing.   The remainder of the right upper quadrant is unremarkable     MACRO: None   Signed by: Arsenio Castillo 10/1/2024 5:47 PM Dictation workstation:   [id number]       Labs reviewed:    Lab Results   Component Value Date    WBC 9.7 09/20/2024    HGB 15.8 09/20/2024    HCT 48.9 09/20/2024     09/20/2024    CHOL 242 (H) 10/18/2024    TRIG 79 10/18/2024    HDL 50.7 10/18/2024    ALT 52 10/18/2024    AST 27 10/18/2024     10/18/2024    K 4.3 10/18/2024     10/18/2024    CREATININE 1.36 (H) 10/18/2024    BUN 14 10/18/2024    CO2 24 10/18/2024    TSH 1.58 09/10/2024    HGBA1C 5.4 09/10/2024       Assessment/Plan   Problem List Items Addressed This Visit       HTN (hypertension)     Increase metoprolol to 100 mg daily continue Norvasc 10 mg daily check blood pressures at home.         Vitamin D deficiency     Continue Vitamin D supplement.          Elevated LFTs - Primary     Fatty liver         Hyperlipidemia     Flax seed omega 3  1200mcg with every meal (TID)    Discussed importance of healthy diet - consider sharing diet informational packet at next visit         Fatty metamorphosis of liver     US of liver showed fatty metamorphosis of the liver (10/01/2024).  Discussed with patient importance of weight loss and dietary changes to decrease progression.   Referral made to dietician after discussion with patient.   Continue to monitor LFTs.         Chronic kidney disease     Stage III will continue to monitor            Continue current medications as listed  Follow up in 2 months check renal function and ultrasound

## 2024-10-28 ENCOUNTER — HOSPITAL ENCOUNTER (OUTPATIENT)
Dept: RADIOLOGY | Facility: HOSPITAL | Age: 24
Discharge: HOME | End: 2024-10-28
Payer: COMMERCIAL

## 2024-10-28 DIAGNOSIS — N18.9 CHRONIC KIDNEY DISEASE, UNSPECIFIED CKD STAGE: ICD-10-CM

## 2024-10-28 PROCEDURE — 76770 US EXAM ABDO BACK WALL COMP: CPT | Performed by: RADIOLOGY

## 2024-10-28 PROCEDURE — 76770 US EXAM ABDO BACK WALL COMP: CPT

## 2024-11-06 ENCOUNTER — APPOINTMENT (OUTPATIENT)
Dept: OTOLARYNGOLOGY | Facility: CLINIC | Age: 24
End: 2024-11-06
Payer: COMMERCIAL

## 2024-11-25 NOTE — PROGRESS NOTES
Nutrition Initial Assessment:     Patient Junior Boyd is a 24 y.o. male being seen at [facility] who was referred by Ez Smith MD on 10/4/24 for  1. Obesity, unspecified class, unspecified obesity type, unspecified whether serious comorbidity present  Referral to Nutrition Services          Nutrition Assessment    Patient Active Problem List   Diagnosis    Blood in semen    HTN (hypertension)    Vitamin D deficiency    Elevated LFTs    Hyperlipidemia    Fatty metamorphosis of liver    Obesity    Chronic kidney disease     Nutrition History:  Food & Nutrition History: Here for guidance on wt loss and incorporating heart health and fatty liver. Recently dx with fatty liver a few months ago. Currently eating 1-2 meals a day as he has been trying to keep his calories low. Has been trying to track calories with My[employer]itness Pal and has been doing this pretty consistently the last ~3 months. Does not like most vegetables dt textures. Previously drank a lot of sweetened beverages like sweet tea and juices and has significantly cut back on this and switched to zero sugar beverages. Rarely drinks water. Orders [employer] which supply fresh meats (chicken and beef)  Food Allergies: none  Food Intolerances: none  Vitamin/mineral intake:  (none)   (none)  Prescription medications:    GI Symptoms: constipation; Occurring >2 weeks? intermittent  Oral Problems: denies; Dentition: own  Sleep Habits:  (goes to bed 11pm-.3am; wakes up 5:30am-10am. When falls alseep sleeps well.)    Diet Recall:  Meal 1: B: skip  Meal 2: L (1:30pm) [employer] x2 fried chicken sandwiches with a biscuit on the side, SF lemonade mixed with zero sugar koolaid pitcher  OR a burger OR sandwich from [employer] OR if cooks may be steak or chicken  Meal 3: D (9pm) cooks meal like chicken breast or steak  Snacks: Usually does not snack, but sometimes a few wafers.  Food Variety: Absent  Oral Nutrition Supplement Use:  (sometimes a protein shake at the  "end of workout session at the gym -- not consistent)  Fluid Intake: zero sugar lemonade and zero sugar Koolaid  Energy Intake: Fair 50-75 %      Food Preparation:  Cooking: Patient  Grocery Shopping: Patient  Dining Out:  (can range from 3-7 days a week; but only once a day -- does this if he does not cook for the week)    Physical Activity:   Goes to the gym 1-3x a week with a goal of 3x a week. Gym sessions are for 45min - 60 mins. Does weights and walks 1 mile    Food Security/Insecurity: Food / Nutrition Program Participation:  (no issues)    Anthropometrics:  Height: 180.3 cm (5' 10.98\")   Weight: (!) 151 kg (333 lb 7.8 oz) (standing scale obtained today)   BMI (Calculated): 46.53    IBW/kg (Dietitian Calculated): 78.1 kg Percent of IBW: 193 %     Adjusted Body Weight (kg): 96.4 kg    Weight History:   Daily Weight  11/26/24 : (!) 151 kg (333 lb 7.8 oz)  10/21/24 : (!) 152 kg (335 lb)  10/04/24 : (!) 154 kg (339 lb)  09/27/24 : (!) 151 kg (332 lb)  09/20/24 : (!) 151 kg (332 lb)  08/13/24 : 148 kg (326 lb 15.1 oz)  08/13/24 : (!) 152 kg (335 lb)    Weight Change %:  Significant Weight Loss: No    Nutrition Focused Physical Exam Findings:  Deferred as pt visually appears well-nourished with no signs of muscle or subcutaneous fat losses      Nutrition Significant Labs:  CMP trend:    Recent Labs     10/18/24  0846 09/10/24  1549   GLUCOSE 85 109*    137   K 4.3 3.8    97*   CO2 24 25   ANIONGAP 16 19   BUN 14 14   CREATININE 1.36* 1.32*   EGFR 75 77   CALCIUM 9.9 10.3   ALBUMIN 4.6 4.8   ALKPHOS 120 130*   PROT 7.4 8.1   AST 27 32   BILITOT 0.6 0.7   ALT 52 64*   , RFP trend:   Recent Labs     10/18/24  0846 09/10/24  1549   GLUCOSE 85 109*    137   K 4.3 3.8    97*   CO2 24 25   ANIONGAP 16 19   BUN 14 14   CREATININE 1.36* 1.32*   EGFR 75 77   CALCIUM 9.9 10.3   ALBUMIN 4.6 4.8   , Lipid Panel trend:    Recent Labs     10/18/24  0846 09/10/24  1549   CHOL 242* 232*   HDL 50.7 51.1 " "  LDLCALC 176* 156*   VLDL 16 25   TRIG 79 126   , Liver trend:   Recent Labs     10/18/24  0846 09/10/24  1549   ALKPHOS 120 130*   AST 27 32   ALT 52 64*   BILITOT 0.6 0.7   , Hgb A1c trend:   Recent Labs     09/10/24  1549   HGBA1C 5.4   , Vit D:   Lab Results   Component Value Date    VITD25 32 10/18/2024    , Iron Panel: No results found for: \"IRON\", \"TIBC\", \"FERRITIN\" , Vit B12: No results found for: \"MUZFFRBD83\" , and Folate: No results found for: \"FOLATE\"     Nutrition Specific Medications:  Current Outpatient Medications   Medication Instructions    amLODIPine (NORVASC) 10 mg, oral, Daily    flaxseed oiL (OMEGA-3 FLAXSEED OIL) 1,000 mg, oral, 3 times daily    metoprolol tartrate (LOPRESSOR) 100 mg, oral, Daily       Estimated Needs did not discuss today    ;     ;         Nutrition Diagnosis   Malnutrition Diagnosis  Patient has Malnutrition Diagnosis: No    Nutrition Diagnosis  Patient has Nutrition Diagnosis: Yes  Diagnosis Status (1): New  Nutrition Diagnosis 1: Food and nutrition related knowledge deficit  Related to (1): limited or lack of prior nutrition-related education  As Evidenced by (1): per pt report looking for guidance on healthy eating patterns, intake of unbalanced meals and snacks per diet recall       Nutrition Interventions/Recommendations   Nutrition Prescription: General healthy diet    Nutrition Interventions:   Food and Nutrient Delivery: Meals & Snacks: Energy-modified diet, General Healthful Diet  Goals: 3 balanced meals a day with 1-2 balanced snacks as needed     Coordination of Care:       Nutrition Education:   Nutrition Education Content: Content related nutrition education  Goals: Balanced meals using plate method, timing of meals, adequate hydration, benefits of exercise    Nutrition Education Topics Discussed:   Provided Keys for a Healthy Lifestyle Handout. Discussed the following:  Start a daily exercise routine. Adults should target 150 minutes of moderate intensity " exercise each week. Start slow and work your way up to this amount. Provided examples of aerobic, resistance, and stretching/balance activities.  Plan balanced meals. The “Plate Method” is a tool used to plan balanced meals. Aim for the following:  One-third to half the plate (or the meal) should be a non-starchy vegetable. Non-starchy vegetables include broccoli, cauliflower, salad greens, carrots, cucumbers, asparagus, green beans, tomatoes, and many more.  One-third to a quarter of the plate should be a lean protein. Lean proteins include chicken, turkey, fish, lean beef, eggs, nuts, tofu.  One third to a quarter of the plate can be a complex starch or carbohydrate. Examples of complex starches / carbohydrates include starchy vegetables (potatoes, peas, corn, and butternut squash), grains (whole wheat bread, quinoa, and brown rice), legumes (lentils and beans), and fruit.  Olive oil should be the primary fat source used for cooking.  Use a 9-10 inch plate to help manage portions  Pre-plan meal ideas. Spending time planning upfront saves you from relying on convenience foods. It can also help you save money! Your plan does not need to be rigid, but you should have some idea of what meals you are going to make going into the week. Place this information on the refrigerator in plain sight. There are many products you can purchase to help keep you organized.   Stay hydrated with water or other lower calorie beverages. We often confuse our body's signal for thirst with being hungry. Make sure you are staying hydrated, preferably with water. The best indicator of hydration is your urine. It should be a pale yellow. Provided examples of sugar-free beverages and ways to flavor water.   Pay attention to your body's hunger and fullness cues. Introduced patient to using a scale of 1-10 to rate hunger / satiety. Reviewed signs of hunger that patient might or might not feel, and encouraged being attentive to these signals if  "they are present. Encouraged patient to eat when feeling a \"3-4\" on the scale instead of waiting for hunger to become more severe. Encouraged patient to aim to stop eating when feeling at a \"6\" (satisfied, but could eat a little more) or a \"7\" (full but not uncomfortable). Recommended eating slowly and checking in with body to determine when body is really full. Discussed that it takes the brain around 10-15 minutes after eating to feel full. Encouraged using this method in conjunction with balanced foods on the plate using the Plate Method.      Discussed importance of consistent meal pattern   Discussed how eating consistently and balanced meals help improve satiety, boot metabolism and helps to improve blood glucose levels   Encouraged pt to eat first meal of the day within 1-2hrs after waking up to help boost metabolism   Encouraged meals and snacks to be  throughout the day with no more than a 3-4hr gap in between to help boost metabolism      Educational Handouts Provided:  Balanced Breakfast, High Protein Meal Ideas, High Protein Snack Ideas, Foods list (carbohydrate foods, non starchy veggies, protein sources, fat sources) from  Mediterranean Booklet (Pages 6-13), and Keys for a Healthy Lifestyle     Nutrition Counseling: Strategies: Nutrition counseling based on motivational interviewing strategy, Nutrition counseling based on goal setting strategy    Patient Goals: 1. Aim to reduce meat portions from current intake of ~12oz portions down to ~6oz portions to start  2. Aim to increase water intake by drinking at least x1 ~16oz bottle a day   3. Aim to have balanced breakfast using the plate method and have breakfast within 1-2 hours after waking up 3 days a week to start    Readiness to Change : Good  Level of Understanding : Good  Anticipated Compliant : Good         Nutrition Monitoring and Evaluation   Food/Nutrient Related History Monitoring  Monitoring and Evaluation Plan: Meal/snack " pattern  Meal/Snack Pattern: Estimated meal and snack pattern  Criteria: Consume 3 balanced meals per day using plate method & 1-2 balanced snacks a day         Body Composition/Growth/Weight History  Monitoring and Evaluation Plan: Weight  Weight: Measured weight  Criteria: Intentional weight loss of 0.5-2 lb per week, trending toward a clinically significant weight loss of 5-10% of current body weight.    Biochemical Data, Medical Tests and Procedures  Monitoring and Evaluation Plan: Lipid profile  Criteria: CHOL <200;  HDL 40-60;  LDL <100;  TG <150 mg/dL        Follow Up: 6-8 weeks; pt to schedule, has Nutrition Services scheduling line       Time Spent  Prep time on day of patient encounter: 5 minutes  Time spent directly with patient, family or caregiver: 56 minutes  Documentation Time: 5 minutes

## 2024-11-26 ENCOUNTER — NUTRITION (OUTPATIENT)
Dept: NUTRITION | Facility: HOSPITAL | Age: 24
End: 2024-11-26
Payer: COMMERCIAL

## 2024-11-26 VITALS — WEIGHT: 315 LBS | HEIGHT: 71 IN | BODY MASS INDEX: 44.1 KG/M2

## 2024-11-26 DIAGNOSIS — E66.9 OBESITY, UNSPECIFIED CLASS, UNSPECIFIED OBESITY TYPE, UNSPECIFIED WHETHER SERIOUS COMORBIDITY PRESENT: Primary | ICD-10-CM

## 2024-11-26 PROCEDURE — 97802 MEDICAL NUTRITION INDIV IN: CPT

## 2024-11-26 NOTE — PATIENT INSTRUCTIONS
Nutrition Education Topics Discussed:   Provided Keys for a Healthy Lifestyle Handout. Discussed the following:  Start a daily exercise routine. Adults should target 150 minutes of moderate intensity exercise each week. Start slow and work your way up to this amount. Provided examples of aerobic, resistance, and stretching/balance activities.  Plan balanced meals. The “Plate Method” is a tool used to plan balanced meals. Aim for the following:  One-third to half the plate (or the meal) should be a non-starchy vegetable. Non-starchy vegetables include broccoli, cauliflower, salad greens, carrots, cucumbers, asparagus, green beans, tomatoes, and many more.  One-third to a quarter of the plate should be a lean protein. Lean proteins include chicken, turkey, fish, lean beef, eggs, nuts, tofu.  One third to a quarter of the plate can be a complex starch or carbohydrate. Examples of complex starches / carbohydrates include starchy vegetables (potatoes, peas, corn, and butternut squash), grains (whole wheat bread, quinoa, and brown rice), legumes (lentils and beans), and fruit.  Olive oil should be the primary fat source used for cooking.  Use a 9-10 inch plate to help manage portions  Pre-plan meal ideas. Spending time planning upfront saves you from relying on convenience foods. It can also help you save money! Your plan does not need to be rigid, but you should have some idea of what meals you are going to make going into the week. Place this information on the refrigerator in plain sight. There are many products you can purchase to help keep you organized.   Stay hydrated with water or other lower calorie beverages. We often confuse our body's signal for thirst with being hungry. Make sure you are staying hydrated, preferably with water. The best indicator of hydration is your urine. It should be a pale yellow. Provided examples of sugar-free beverages and ways to flavor water.   Pay attention to your body's hunger and  "fullness cues. Introduced patient to using a scale of 1-10 to rate hunger / satiety. Reviewed signs of hunger that patient might or might not feel, and encouraged being attentive to these signals if they are present. Encouraged patient to eat when feeling a \"3-4\" on the scale instead of waiting for hunger to become more severe. Encouraged patient to aim to stop eating when feeling at a \"6\" (satisfied, but could eat a little more) or a \"7\" (full but not uncomfortable). Recommended eating slowly and checking in with body to determine when body is really full. Discussed that it takes the brain around 10-15 minutes after eating to feel full. Encouraged using this method in conjunction with balanced foods on the plate using the Plate Method.      Discussed importance of consistent meal pattern   Discussed how eating consistently and balanced meals help improve satiety, boot metabolism and helps to improve blood glucose levels   Encouraged pt to eat first meal of the day within 1-2hrs after waking up to help boost metabolism   Encouraged meals and snacks to be  throughout the day with no more than a 3-4hr gap in between to help boost metabolism      Educational Handouts Provided:  Balanced Breakfast, High Protein Meal Ideas, High Protein Snack Ideas, Foods list (carbohydrate foods, non starchy veggies, protein sources, fat sources) from  Mediterranean Booklet (Pages 6-13), and Keys for a Healthy Lifestyle     Patient Goals:   Aim to reduce meat portions from current intake of ~12oz portions down to ~6oz portions to start    Aim to increase water intake by drinking at least x1 ~16oz bottle a day     Aim to have balanced breakfast using the plate method and have breakfast within 1-2 hours after waking up 3 days a week to start  "

## 2024-12-13 ENCOUNTER — APPOINTMENT (OUTPATIENT)
Dept: PRIMARY CARE | Facility: CLINIC | Age: 24
End: 2024-12-13
Payer: COMMERCIAL

## 2024-12-16 ENCOUNTER — APPOINTMENT (OUTPATIENT)
Dept: PRIMARY CARE | Facility: CLINIC | Age: 24
End: 2024-12-16
Payer: COMMERCIAL